# Patient Record
Sex: FEMALE | Race: WHITE | Employment: UNEMPLOYED | URBAN - METROPOLITAN AREA
[De-identification: names, ages, dates, MRNs, and addresses within clinical notes are randomized per-mention and may not be internally consistent; named-entity substitution may affect disease eponyms.]

---

## 2019-09-05 ENCOUNTER — TELEPHONE (OUTPATIENT)
Dept: FAMILY MEDICINE CLINIC | Age: 69
End: 2019-09-05

## 2019-09-05 NOTE — TELEPHONE ENCOUNTER
Based on Primary Care 7 Measures, patient is due for their Medicare Annual Wellness Visit. Letter sent to patient in regards to scheduling at their earliest convenience. Based on Primary Care 6 Measures, patient is due for a Colonoscopy/Fit Test. Letter, and Fit Kit was mailed to patient. Based on Primary Care 6 Measures, patient is due for Mammogram. Letter, as well as a script for mammogram was mailed to patient.

## 2019-09-10 ENCOUNTER — OFFICE VISIT (OUTPATIENT)
Dept: FAMILY MEDICINE CLINIC | Age: 69
End: 2019-09-10
Payer: MEDICARE

## 2019-09-10 VITALS
DIASTOLIC BLOOD PRESSURE: 70 MMHG | OXYGEN SATURATION: 98 % | WEIGHT: 109.9 LBS | TEMPERATURE: 97.8 F | HEIGHT: 62 IN | HEART RATE: 77 BPM | SYSTOLIC BLOOD PRESSURE: 110 MMHG | BODY MASS INDEX: 20.22 KG/M2 | RESPIRATION RATE: 18 BRPM

## 2019-09-10 DIAGNOSIS — E06.3 HASHIMOTO'S THYROIDITIS: ICD-10-CM

## 2019-09-10 DIAGNOSIS — L98.9 LEG SKIN LESION, RIGHT: ICD-10-CM

## 2019-09-10 DIAGNOSIS — Z78.0 POST-MENOPAUSAL: ICD-10-CM

## 2019-09-10 DIAGNOSIS — Z12.31 SCREENING MAMMOGRAM, ENCOUNTER FOR: ICD-10-CM

## 2019-09-10 DIAGNOSIS — Z76.89 ENCOUNTER TO ESTABLISH CARE: Primary | ICD-10-CM

## 2019-09-10 DIAGNOSIS — M81.0 AGE-RELATED OSTEOPOROSIS WITHOUT CURRENT PATHOLOGICAL FRACTURE: ICD-10-CM

## 2019-09-10 DIAGNOSIS — L80 PRIMARY VITILIGO: ICD-10-CM

## 2019-09-10 PROCEDURE — 4040F PNEUMOC VAC/ADMIN/RCVD: CPT | Performed by: NURSE PRACTITIONER

## 2019-09-10 PROCEDURE — 99204 OFFICE O/P NEW MOD 45 MIN: CPT | Performed by: NURSE PRACTITIONER

## 2019-09-10 PROCEDURE — 3017F COLORECTAL CA SCREEN DOC REV: CPT | Performed by: NURSE PRACTITIONER

## 2019-09-10 PROCEDURE — G8400 PT W/DXA NO RESULTS DOC: HCPCS | Performed by: NURSE PRACTITIONER

## 2019-09-10 PROCEDURE — G8420 CALC BMI NORM PARAMETERS: HCPCS | Performed by: NURSE PRACTITIONER

## 2019-09-10 PROCEDURE — 1036F TOBACCO NON-USER: CPT | Performed by: NURSE PRACTITIONER

## 2019-09-10 PROCEDURE — 1090F PRES/ABSN URINE INCON ASSESS: CPT | Performed by: NURSE PRACTITIONER

## 2019-09-10 PROCEDURE — G8427 DOCREV CUR MEDS BY ELIG CLIN: HCPCS | Performed by: NURSE PRACTITIONER

## 2019-09-10 PROCEDURE — 1123F ACP DISCUSS/DSCN MKR DOCD: CPT | Performed by: NURSE PRACTITIONER

## 2019-09-10 RX ORDER — M-VIT,TX,IRON,MINS/CALC/FOLIC 27MG-0.4MG
1 TABLET ORAL DAILY
COMMUNITY

## 2019-09-10 SDOH — HEALTH STABILITY: MENTAL HEALTH: HOW OFTEN DO YOU HAVE A DRINK CONTAINING ALCOHOL?: NEVER

## 2019-09-10 ASSESSMENT — ENCOUNTER SYMPTOMS
COUGH: 0
VOICE CHANGE: 0
CONSTIPATION: 0
SORE THROAT: 0
EYE REDNESS: 0
SINUS PRESSURE: 0
EYE PAIN: 0
FACIAL SWELLING: 0
RHINORRHEA: 0
SINUS PAIN: 0
CHEST TIGHTNESS: 0
VOMITING: 0
BACK PAIN: 0
ABDOMINAL PAIN: 0
PHOTOPHOBIA: 0
NAUSEA: 0
TROUBLE SWALLOWING: 0
EYE DISCHARGE: 0
EYE ITCHING: 0
DIARRHEA: 0
SHORTNESS OF BREATH: 0
COLOR CHANGE: 0
WHEEZING: 0

## 2019-09-10 ASSESSMENT — PATIENT HEALTH QUESTIONNAIRE - PHQ9
1. LITTLE INTEREST OR PLEASURE IN DOING THINGS: 0
SUM OF ALL RESPONSES TO PHQ9 QUESTIONS 1 & 2: 0
2. FEELING DOWN, DEPRESSED OR HOPELESS: 0
SUM OF ALL RESPONSES TO PHQ QUESTIONS 1-9: 0
SUM OF ALL RESPONSES TO PHQ QUESTIONS 1-9: 0

## 2019-09-10 NOTE — PATIENT INSTRUCTIONS
appointments, and call your doctor if you are having problems. It's also a good idea to know your test results and keep a list of the medicines you take. How can you care for yourself at home? · Put creams or ointments on your skin as directed by your doctor. Be careful if you put them around your eyes, nose, or mouth. · Take your medicines exactly as prescribed. Call your doctor if you have any problems with your medicine. · If you have light therapy, your skin will be exposed to a special light. Follow your doctor's directions on caring for your skin. · Protect your skin from the sun. It is most important to protect the white patches. Use sunscreen, hats with wide brims, sunglasses, and clothing that covers your arms and legs. · Talk to your doctor about sunless tanning products. You can buy these without a prescription. When should you call for help? Watch closely for changes in your health, and be sure to contact your doctor if:    · The skin changes are getting worse.     · You do not get better as expected. Where can you learn more? Go to [email or web address]://[email or web address].Civolution. org and sign in to your Pet360 account. Enter B241 in the [facility] box to learn more about \"Vitiligo: Care Instructions. \"     If you do not have an account, please click on the \"Sign Up Now\" link. Current as of: April 1, 2019  Content Version: 12.1  © 1492-3375 Healthwise, Incorporated. Care instructions adapted under license by [facility] ([facility]). If you have questions about a medical condition or this instruction, always ask your healthcare professional. [address] any warranty or liability for your use of this information.

## 2019-09-10 NOTE — PROGRESS NOTES
(CHOLECALCIFEROL) 1000 UNIT TABS tablet, Take 1,000 Units by mouth daily. , Disp: , Rfl:   Social History     Socioeconomic History    Marital status:      Spouse name: None    Number of children: None    Years of education: None    Highest education level: None   Occupational History    None   Social Needs    Financial resource strain: None    Food insecurity:     Worry: None     Inability: None    Transportation needs:     Medical: None     Non-medical: None   Tobacco Use    Smoking status: Never Smoker    Smokeless tobacco: Never Used   Substance and Sexual Activity    Alcohol use: Never     Frequency: Never    Drug use: Never    Sexual activity: None   Lifestyle    Physical activity:     Days per week: None     Minutes per session: None    Stress: None   Relationships    Social connections:     Talks on phone: None     Gets together: None     Attends Cheondoism service: None     Active member of club or organization: None     Attends meetings of clubs or organizations: None     Relationship status: None    Intimate partner violence:     Fear of current or ex partner: None     Emotionally abused: None     Physically abused: None     Forced sexual activity: None   Other Topics Concern    None   Social History Narrative    None       I have reviewed Ludmila's allergies, medications, problem list, medical, social and family history and have updated as needed in the electronic medical record    Review of Systems   Constitutional: Negative for activity change, appetite change, chills, diaphoresis, fatigue, fever and unexpected weight change. HENT: Negative for congestion, dental problem, drooling, ear discharge, ear pain, facial swelling, hearing loss, mouth sores, nosebleeds, postnasal drip, rhinorrhea, sinus pressure, sinus pain, sneezing, sore throat, tinnitus, trouble swallowing and voice change. Eyes: Negative for photophobia, pain, discharge, redness, itching and visual disturbance. Glaucoma bilateral   Respiratory: Negative for cough, chest tightness, shortness of breath and wheezing. Cardiovascular: Negative for chest pain, palpitations and leg swelling. Gastrointestinal: Negative for abdominal pain, constipation, diarrhea, nausea and vomiting. Endocrine: Negative for cold intolerance, heat intolerance, polydipsia, polyphagia and polyuria. Genitourinary: Negative for decreased urine volume, difficulty urinating, dyspareunia, dysuria, enuresis, flank pain, frequency, genital sores, hematuria, menstrual problem, pelvic pain, urgency, vaginal bleeding, vaginal discharge and vaginal pain. Musculoskeletal: Negative for arthralgias, back pain, gait problem, joint swelling, myalgias, neck pain and neck stiffness. Skin: Negative for color change, pallor, rash and wound. Vitiligo on hands, feet   Allergic/Immunologic: Negative for environmental allergies, food allergies and immunocompromised state. Neurological: Negative for dizziness, tremors, seizures, syncope, facial asymmetry, speech difficulty, weakness, light-headedness, numbness and headaches. Hematological: Negative for adenopathy. Does not bruise/bleed easily. Psychiatric/Behavioral: Negative for agitation, behavioral problems, confusion, decreased concentration, dysphoric mood, hallucinations, self-injury, sleep disturbance and suicidal ideas. The patient is not nervous/anxious and is not hyperactive.         OBJECTIVE:     VS:  Wt Readings from Last 3 Encounters:   09/10/19 109 lb 14.4 oz (49.9 kg)   01/20/14 98 lb (44.5 kg)                       Vitals:    09/10/19 1429   BP: 110/70   Pulse: 77   Resp: 18   Temp: 97.8 °F (36.6 °C)   TempSrc: Temporal   SpO2: 98%   Weight: 109 lb 14.4 oz (49.9 kg)   Height: 5' 2\" (1.575 m)       General: Alert and oriented to person, place, and time, well developed and well nourished, in no acute distress  SKIN: Warm and dry, intact with vitiligo to the hands and feet and

## 2019-09-18 ENCOUNTER — TELEPHONE (OUTPATIENT)
Dept: FAMILY MEDICINE CLINIC | Age: 69
End: 2019-09-18

## 2019-09-18 DIAGNOSIS — E06.3 HASHIMOTO'S THYROIDITIS: Primary | ICD-10-CM

## 2019-09-18 DIAGNOSIS — L80 PRIMARY VITILIGO: ICD-10-CM

## 2019-10-08 ENCOUNTER — HOSPITAL ENCOUNTER (OUTPATIENT)
Dept: MAMMOGRAPHY | Age: 69
Discharge: HOME | End: 2019-10-10
Payer: MEDICARE

## 2019-10-08 ENCOUNTER — HOSPITAL ENCOUNTER (OUTPATIENT)
Age: 69
Discharge: HOME | End: 2019-10-08
Payer: MEDICARE

## 2019-10-08 DIAGNOSIS — E06.3 HASHIMOTO'S THYROIDITIS: ICD-10-CM

## 2019-10-08 DIAGNOSIS — L80 PRIMARY VITILIGO: ICD-10-CM

## 2019-10-08 DIAGNOSIS — Z12.31 SCREENING MAMMOGRAM, ENCOUNTER FOR: ICD-10-CM

## 2019-10-08 DIAGNOSIS — Z78.0 POST-MENOPAUSAL: ICD-10-CM

## 2019-10-08 PROCEDURE — 77080 DXA BONE DENSITY AXIAL: CPT

## 2019-10-08 PROCEDURE — 36415 COLL VENOUS BLD VENIPUNCTURE: CPT

## 2019-10-08 PROCEDURE — 77063 BREAST TOMOSYNTHESIS BI: CPT

## 2019-10-08 PROCEDURE — 86038 ANTINUCLEAR ANTIBODIES: CPT

## 2019-10-09 LAB — ANTI-NUCLEAR ANTIBODY (ANA): NEGATIVE

## 2019-10-10 PROBLEM — Z12.31 SCREENING MAMMOGRAM, ENCOUNTER FOR: Status: RESOLVED | Noted: 2019-09-10 | Resolved: 2019-10-10

## 2019-10-22 ENCOUNTER — OFFICE VISIT (OUTPATIENT)
Dept: FAMILY MEDICINE CLINIC | Age: 69
End: 2019-10-22
Payer: MEDICARE

## 2019-10-22 VITALS
HEIGHT: 62 IN | RESPIRATION RATE: 18 BRPM | OXYGEN SATURATION: 99 % | TEMPERATURE: 97.4 F | SYSTOLIC BLOOD PRESSURE: 120 MMHG | DIASTOLIC BLOOD PRESSURE: 78 MMHG | BODY MASS INDEX: 20.59 KG/M2 | HEART RATE: 79 BPM | WEIGHT: 111.9 LBS

## 2019-10-22 DIAGNOSIS — Z00.00 ROUTINE GENERAL MEDICAL EXAMINATION AT A HEALTH CARE FACILITY: Primary | ICD-10-CM

## 2019-10-22 DIAGNOSIS — M81.0 AGE-RELATED OSTEOPOROSIS WITHOUT CURRENT PATHOLOGICAL FRACTURE: ICD-10-CM

## 2019-10-22 PROCEDURE — 3017F COLORECTAL CA SCREEN DOC REV: CPT | Performed by: NURSE PRACTITIONER

## 2019-10-22 PROCEDURE — G8484 FLU IMMUNIZE NO ADMIN: HCPCS | Performed by: NURSE PRACTITIONER

## 2019-10-22 PROCEDURE — 1123F ACP DISCUSS/DSCN MKR DOCD: CPT | Performed by: NURSE PRACTITIONER

## 2019-10-22 PROCEDURE — G0438 PPPS, INITIAL VISIT: HCPCS | Performed by: NURSE PRACTITIONER

## 2019-10-22 PROCEDURE — 4040F PNEUMOC VAC/ADMIN/RCVD: CPT | Performed by: NURSE PRACTITIONER

## 2019-10-22 ASSESSMENT — LIFESTYLE VARIABLES
HOW OFTEN DO YOU HAVE SIX OR MORE DRINKS ON ONE OCCASION: 0
HOW OFTEN DURING THE LAST YEAR HAVE YOU FAILED TO DO WHAT WAS NORMALLY EXPECTED FROM YOU BECAUSE OF DRINKING: 0
AUDIT TOTAL SCORE: 3
AUDIT-C TOTAL SCORE: 3
HAS A RELATIVE, FRIEND, DOCTOR, OR ANOTHER HEALTH PROFESSIONAL EXPRESSED CONCERN ABOUT YOUR DRINKING OR SUGGESTED YOU CUT DOWN: 0
HOW OFTEN DURING THE LAST YEAR HAVE YOU FOUND THAT YOU WERE NOT ABLE TO STOP DRINKING ONCE YOU HAD STARTED: 0
HOW OFTEN DURING THE LAST YEAR HAVE YOU HAD A FEELING OF GUILT OR REMORSE AFTER DRINKING: 0
HOW MANY STANDARD DRINKS CONTAINING ALCOHOL DO YOU HAVE ON A TYPICAL DAY: 0
HAVE YOU OR SOMEONE ELSE BEEN INJURED AS A RESULT OF YOUR DRINKING: 0
HOW OFTEN DURING THE LAST YEAR HAVE YOU BEEN UNABLE TO REMEMBER WHAT HAPPENED THE NIGHT BEFORE BECAUSE YOU HAD BEEN DRINKING: 0
HOW OFTEN DO YOU HAVE A DRINK CONTAINING ALCOHOL: 3
HOW OFTEN DURING THE LAST YEAR HAVE YOU NEEDED AN ALCOHOLIC DRINK FIRST THING IN THE MORNING TO GET YOURSELF GOING AFTER A NIGHT OF HEAVY DRINKING: 0

## 2019-10-22 ASSESSMENT — ENCOUNTER SYMPTOMS
NAUSEA: 0
RHINORRHEA: 0
BACK PAIN: 0
SORE THROAT: 0
SHORTNESS OF BREATH: 0
CHEST TIGHTNESS: 0
DIARRHEA: 0
SINUS PRESSURE: 0
VOMITING: 0
SINUS PAIN: 0
WHEEZING: 0
COUGH: 0
VOICE CHANGE: 0
ABDOMINAL PAIN: 0
CONSTIPATION: 0
COLOR CHANGE: 0
FACIAL SWELLING: 0
TROUBLE SWALLOWING: 0

## 2019-10-22 ASSESSMENT — PATIENT HEALTH QUESTIONNAIRE - PHQ9
SUM OF ALL RESPONSES TO PHQ QUESTIONS 1-9: 0
SUM OF ALL RESPONSES TO PHQ QUESTIONS 1-9: 0

## 2020-08-18 ENCOUNTER — HOSPITAL ENCOUNTER (OUTPATIENT)
Age: 70
Discharge: HOME | End: 2020-08-20
Payer: MEDICARE

## 2020-08-18 LAB
ALBUMIN SERPL-MCNC: 4.5 G/DL (ref 3.5–5.2)
ALP BLD-CCNC: 84 U/L (ref 35–104)
ALT SERPL-CCNC: 10 U/L (ref 0–32)
ANION GAP SERPL CALCULATED.3IONS-SCNC: 17 MMOL/L (ref 7–16)
AST SERPL-CCNC: 15 U/L (ref 0–31)
BILIRUB SERPL-MCNC: 0.8 MG/DL (ref 0–1.2)
BUN BLDV-MCNC: 15 MG/DL (ref 8–23)
C-REACTIVE PROTEIN: 0.1 MG/DL (ref 0–0.4)
CALCIUM SERPL-MCNC: 9.7 MG/DL (ref 8.6–10.2)
CHLORIDE BLD-SCNC: 106 MMOL/L (ref 98–107)
CO2: 23 MMOL/L (ref 22–29)
CREAT SERPL-MCNC: 0.8 MG/DL (ref 0.5–1)
GFR AFRICAN AMERICAN: >60
GFR NON-AFRICAN AMERICAN: >60 ML/MIN/1.73
GLUCOSE BLD-MCNC: 83 MG/DL (ref 74–99)
HCT VFR BLD CALC: 44.2 % (ref 34–48)
HEMOGLOBIN: 13.6 G/DL (ref 11.5–15.5)
MCH RBC QN AUTO: 30.8 PG (ref 26–35)
MCHC RBC AUTO-ENTMCNC: 30.8 % (ref 32–34.5)
MCV RBC AUTO: 100 FL (ref 80–99.9)
PDW BLD-RTO: 12 FL (ref 11.5–15)
PLATELET # BLD: 290 E9/L (ref 130–450)
PMV BLD AUTO: 10.8 FL (ref 7–12)
POTASSIUM SERPL-SCNC: 5.5 MMOL/L (ref 3.5–5)
RBC # BLD: 4.42 E12/L (ref 3.5–5.5)
RHEUMATOID FACTOR: <10 IU/ML (ref 0–13)
SODIUM BLD-SCNC: 146 MMOL/L (ref 132–146)
T3 FREE: 2.9 PG/ML (ref 2–4.4)
T4 FREE: 0.96 NG/DL (ref 0.93–1.7)
TOTAL PROTEIN: 7.2 G/DL (ref 6.4–8.3)
TSH SERPL DL<=0.05 MIU/L-ACNC: 1.93 UIU/ML (ref 0.27–4.2)
WBC # BLD: 4.3 E9/L (ref 4.5–11.5)

## 2020-08-18 PROCEDURE — 82306 VITAMIN D 25 HYDROXY: CPT

## 2020-08-18 PROCEDURE — 84439 ASSAY OF FREE THYROXINE: CPT

## 2020-08-18 PROCEDURE — 85027 COMPLETE CBC AUTOMATED: CPT

## 2020-08-18 PROCEDURE — 86431 RHEUMATOID FACTOR QUANT: CPT

## 2020-08-18 PROCEDURE — 80053 COMPREHEN METABOLIC PANEL: CPT

## 2020-08-18 PROCEDURE — 84481 FREE ASSAY (FT-3): CPT

## 2020-08-18 PROCEDURE — 86140 C-REACTIVE PROTEIN: CPT

## 2020-08-18 PROCEDURE — 84443 ASSAY THYROID STIM HORMONE: CPT

## 2020-08-18 PROCEDURE — 36415 COLL VENOUS BLD VENIPUNCTURE: CPT

## 2020-08-18 PROCEDURE — 86200 CCP ANTIBODY: CPT

## 2020-08-19 LAB — VITAMIN D 25-HYDROXY: 68 NG/ML (ref 30–100)

## 2020-08-21 LAB — CCP IGG ANTIBODIES: 2 UNITS (ref 0–19)

## 2020-11-05 ENCOUNTER — OFFICE VISIT (OUTPATIENT)
Dept: FAMILY MEDICINE CLINIC | Age: 70
End: 2020-11-05
Payer: MEDICARE

## 2020-11-05 ENCOUNTER — HOSPITAL ENCOUNTER (OUTPATIENT)
Dept: MAMMOGRAPHY | Age: 70
Discharge: HOME | End: 2020-11-07
Payer: MEDICARE

## 2020-11-05 VITALS
TEMPERATURE: 97.5 F | DIASTOLIC BLOOD PRESSURE: 68 MMHG | OXYGEN SATURATION: 99 % | HEIGHT: 61 IN | HEART RATE: 72 BPM | RESPIRATION RATE: 16 BRPM | WEIGHT: 111.1 LBS | BODY MASS INDEX: 20.97 KG/M2 | SYSTOLIC BLOOD PRESSURE: 110 MMHG

## 2020-11-05 DIAGNOSIS — Z13.220 SCREENING FOR HYPERLIPIDEMIA: ICD-10-CM

## 2020-11-05 LAB
CHOLESTEROL, FASTING: 229 MG/DL (ref 0–199)
HDLC SERPL-MCNC: 100 MG/DL
LDL CHOLESTEROL CALCULATED: 118 MG/DL (ref 0–99)
TRIGLYCERIDE, FASTING: 53 MG/DL (ref 0–149)
VLDLC SERPL CALC-MCNC: 11 MG/DL

## 2020-11-05 PROCEDURE — G0439 PPPS, SUBSEQ VISIT: HCPCS | Performed by: NURSE PRACTITIONER

## 2020-11-05 PROCEDURE — G8484 FLU IMMUNIZE NO ADMIN: HCPCS | Performed by: NURSE PRACTITIONER

## 2020-11-05 PROCEDURE — 1123F ACP DISCUSS/DSCN MKR DOCD: CPT | Performed by: NURSE PRACTITIONER

## 2020-11-05 PROCEDURE — 3017F COLORECTAL CA SCREEN DOC REV: CPT | Performed by: NURSE PRACTITIONER

## 2020-11-05 PROCEDURE — 77063 BREAST TOMOSYNTHESIS BI: CPT

## 2020-11-05 PROCEDURE — 4040F PNEUMOC VAC/ADMIN/RCVD: CPT | Performed by: NURSE PRACTITIONER

## 2020-11-05 RX ORDER — UREA 10 %
500 LOTION (ML) TOPICAL DAILY
COMMUNITY

## 2020-11-05 ASSESSMENT — PATIENT HEALTH QUESTIONNAIRE - PHQ9
SUM OF ALL RESPONSES TO PHQ9 QUESTIONS 1 & 2: 0
SUM OF ALL RESPONSES TO PHQ QUESTIONS 1-9: 0
1. LITTLE INTEREST OR PLEASURE IN DOING THINGS: 0
2. FEELING DOWN, DEPRESSED OR HOPELESS: 0
SUM OF ALL RESPONSES TO PHQ QUESTIONS 1-9: 0
SUM OF ALL RESPONSES TO PHQ QUESTIONS 1-9: 0
1. LITTLE INTEREST OR PLEASURE IN DOING THINGS: 0
SUM OF ALL RESPONSES TO PHQ QUESTIONS 1-9: 0
SUM OF ALL RESPONSES TO PHQ QUESTIONS 1-9: 0
2. FEELING DOWN, DEPRESSED OR HOPELESS: 0
SUM OF ALL RESPONSES TO PHQ9 QUESTIONS 1 & 2: 0
SUM OF ALL RESPONSES TO PHQ QUESTIONS 1-9: 0

## 2020-11-05 ASSESSMENT — LIFESTYLE VARIABLES
HOW OFTEN DURING THE LAST YEAR HAVE YOU BEEN UNABLE TO REMEMBER WHAT HAPPENED THE NIGHT BEFORE BECAUSE YOU HAD BEEN DRINKING: NEVER
HOW OFTEN DO YOU HAVE A DRINK CONTAINING ALCOHOL: MONTHLY OR LESS
AUDIT-C TOTAL SCORE: 0
AUDIT TOTAL SCORE: 0
HOW MANY STANDARD DRINKS CONTAINING ALCOHOL DO YOU HAVE ON A TYPICAL DAY: ONE OR TWO
HOW OFTEN DURING THE LAST YEAR HAVE YOU HAD A FEELING OF GUILT OR REMORSE AFTER DRINKING: 0
HAS A RELATIVE, FRIEND, DOCTOR, OR ANOTHER HEALTH PROFESSIONAL EXPRESSED CONCERN ABOUT YOUR DRINKING OR SUGGESTED YOU CUT DOWN: NO
HOW MANY STANDARD DRINKS CONTAINING ALCOHOL DO YOU HAVE ON A TYPICAL DAY: 0
AUDIT-C TOTAL SCORE: 1
HOW OFTEN DURING THE LAST YEAR HAVE YOU NEEDED AN ALCOHOLIC DRINK FIRST THING IN THE MORNING TO GET YOURSELF GOING AFTER A NIGHT OF HEAVY DRINKING: NEVER
HOW OFTEN DURING THE LAST YEAR HAVE YOU FAILED TO DO WHAT WAS NORMALLY EXPECTED FROM YOU BECAUSE OF DRINKING: 0
HAVE YOU OR SOMEONE ELSE BEEN INJURED AS A RESULT OF YOUR DRINKING: 0
HAS A RELATIVE, FRIEND, DOCTOR, OR ANOTHER HEALTH PROFESSIONAL EXPRESSED CONCERN ABOUT YOUR DRINKING OR SUGGESTED YOU CUT DOWN: 0
HOW OFTEN DO YOU HAVE SIX OR MORE DRINKS ON ONE OCCASION: 0
HOW OFTEN DO YOU HAVE SIX OR MORE DRINKS ON ONE OCCASION: NEVER
HOW OFTEN DURING THE LAST YEAR HAVE YOU NEEDED AN ALCOHOLIC DRINK FIRST THING IN THE MORNING TO GET YOURSELF GOING AFTER A NIGHT OF HEAVY DRINKING: 0
HOW OFTEN DO YOU HAVE A DRINK CONTAINING ALCOHOL: 1
HOW OFTEN DURING THE LAST YEAR HAVE YOU FAILED TO DO WHAT WAS NORMALLY EXPECTED FROM YOU BECAUSE OF DRINKING: NEVER
HOW OFTEN DURING THE LAST YEAR HAVE YOU FOUND THAT YOU WERE NOT ABLE TO STOP DRINKING ONCE YOU HAD STARTED: NEVER
HOW OFTEN DURING THE LAST YEAR HAVE YOU FOUND THAT YOU WERE NOT ABLE TO STOP DRINKING ONCE YOU HAD STARTED: 0
HOW OFTEN DURING THE LAST YEAR HAVE YOU HAD A FEELING OF GUILT OR REMORSE AFTER DRINKING: NEVER
HAVE YOU OR SOMEONE ELSE BEEN INJURED AS A RESULT OF YOUR DRINKING: NO
AUDIT TOTAL SCORE: 1
HOW OFTEN DURING THE LAST YEAR HAVE YOU BEEN UNABLE TO REMEMBER WHAT HAPPENED THE NIGHT BEFORE BECAUSE YOU HAD BEEN DRINKING: 0

## 2020-11-05 NOTE — PATIENT INSTRUCTIONS
Personalized Preventive Plan for Avel Patel - 11/5/2020  Medicare offers a range of preventive health benefits. Some of the tests and screenings are paid in full while other may be subject to a deductible, co-insurance, and/or copay. Some of these benefits include a comprehensive review of your medical history including lifestyle, illnesses that may run in your family, and various assessments and screenings as appropriate. After reviewing your medical record and screening and assessments performed today your provider may have ordered immunizations, labs, imaging, and/or referrals for you. A list of these orders (if applicable) as well as your Preventive Care list are included within your After Visit Summary for your review. Other Preventive Recommendations:    · A preventive eye exam performed by an eye specialist is recommended every 1-2 years to screen for glaucoma; cataracts, macular degeneration, and other eye disorders. · A preventive dental visit is recommended every 6 months. · Try to get at least 150 minutes of exercise per week or 10,000 steps per day on a pedometer . · Order or download the FREE \"Exercise & Physical Activity: Your Everyday Guide\" from [employer]. Call 6-716.905.3678 or search [employer] online. · You need 2736-3804 mg of calcium and 0395-4155 IU of vitamin D per day. It is possible to meet your calcium requirement with diet alone, but a vitamin D supplement is usually necessary to meet this goal.  · When exposed to the sun, use a sunscreen that protects against both UVA and UVB radiation with an SPF of 30 or greater. Reapply every 2 to 3 hours or after sweating, drying off with a towel, or swimming. · Always wear a seat belt when traveling in a car. Always wear a helmet when riding a bicycle or motorcycle.

## 2020-11-06 ENCOUNTER — TELEPHONE (OUTPATIENT)
Dept: FAMILY MEDICINE CLINIC | Age: 70
End: 2020-11-06

## 2020-11-09 ENCOUNTER — TELEPHONE (OUTPATIENT)
Dept: FAMILY MEDICINE CLINIC | Age: 70
End: 2020-11-09

## 2020-11-09 NOTE — TELEPHONE ENCOUNTER
Call Michelle mammogram is normal and fasting lipids are stable. I called and left a message for the patient regarding the above.

## 2020-12-20 NOTE — PROGRESS NOTES
Medicare Annual Wellness Visit  Name: Enrique Whitehead Date: 2020   MRN: <[id number]> Sex: Female   Age: 78 y.o. Ethnicity: Non-/Non    : 1950 Race: Oliverio Maurer is here for Annual Exam and Health Maintenance (declined flu, due for pneu23, shingle, Colonscopy done with youseff, due for hep c screen and lipid )    Screenings for behavioral, psychosocial and functional/safety risks, and cognitive dysfunction are all negative except as indicated below. These results, as well as other patient data from the [address] form, are documented in Flowsheets linked to this Encounter. Allergies   Allergen Reactions    Ciprofloxacin Other (See Comments)     Hearing damage    Erythromycin Other (See Comments)     Hearing damage    Tetracyclines & Related Other (See Comments)     Hearing damage         Prior to Visit Medications    Medication Sig Taking? Authorizing Provider   calcium carbonate (OS-DELFINA) 1250 (500 Ca) MG chewable tablet Take 500 mg by mouth daily Yes Historical Provider, MD   Multiple Vitamins-Minerals (THERAPEUTIC MULTIVITAMIN-MINERALS) tablet Take 1 tablet by mouth daily Yes Historical Provider, MD   travoprost, benzalkonium, (TRAVATAN) 0.004 % ophthalmic solution 1 drop nightly. Yes Historical Provider, MD   vitamin D (CHOLECALCIFEROL) 1000 UNIT TABS tablet Take 1,000 Units by mouth daily.  Yes Historical Provider, MD         Past Medical History:   Diagnosis Date    Cancer ([address] 2013    basal cell right upper arm and neck ,     Depression     Fracture of left elbow 2016    fall    Glaucoma and corneal anomaly     Hashimoto's thyroiditis 2019    History of cardiovascular stress test 14    Lexiscan    Osteoporosis     Osteoporosis     Vitiligo        Past Surgical History:   Procedure Laterality Date    SKIN CANCER EXCISION Right     arm 2013 and neck basal cell          Family History   Problem Relation Age of Onset    Heart no suspicious masses, no skin or nipple changes or axillary nodes  Extremities: no cyanosis, clubbing or edema  Musculoskeletal: normal range of motion, no joint swelling, deformity or tenderness  Neurologic: reflexes normal and symmetric, no cranial nerve deficit, gait, coordination and speech normal    Patient's complete Health Risk Assessment and screening values have been reviewed and are found in Flowsheets. The following problems were reviewed today and where indicated follow up appointments were made and/or referrals ordered. Positive Risk Factor Screenings with Interventions:       General Health and ACP:  General  In general, how would you say your health is?: Good  In the past 7 days, have you experienced any of the following? New or Increased Pain, New or Increased Fatigue, Loneliness, Social Isolation, Stress or Anger?: None of These  Do you get the social and emotional support that you need?: Yes  Do you have a Living Will?: Yes  Advance Directives     Power of  Living Will ACP-Advance Directive ACP-Power of     Not on File Not on File [address] Interventions:  · No Living Will: ACP documents already completed- patient asked to provide copy to the office    Hearing/Vision:  No exam data present  Hearing/Vision  Do you or your family notice any trouble with your hearing?: No  Do you have difficulty driving, watching TV, or doing any of your daily activities because of your eyesight?: (!) Yes  Have you had an eye exam within the past year?: Yes  Hearing/Vision Interventions:  · [facility] Asif Moralez [facility] last week due to glaucoma    Personalized Preventive Plan   Current Health Maintenance Status    There is no immunization history on file for this patient.      Health Maintenance   Topic Date Due    DTaP/Tdap/Td vaccine (1 - Tdap) 07/05/1971    Lipid screen  07/05/1992    Colon cancer screen colonoscopy  07/05/2002   Blaine Coleman Annual Wellness Visit (AWV) 09/01/2019    Flu vaccine (1) 11/05/2021 (Originally 9/1/2020)    Shingles Vaccine (1 of 2) 11/05/2021 (Originally 7/5/2002)    Pneumococcal 65+ years Vaccine (1 of 1 - PPSV23) 11/16/2022 (Originally 7/5/2017)    Breast cancer screen  10/08/2021    DEXA (modify frequency per FRAX score)  Completed    Hepatitis A vaccine  Aged Out    Hepatitis B vaccine  Aged Out    Hib vaccine  Aged Out    Meningococcal (ACWY) vaccine  Aged Out    Hepatitis C screen  Discontinued     Recommendations for [employer]less [employer] Due: see orders and patient instructions/AVS.  . Recommended screening schedule for the next 5-10 years is provided to the patient in written form: see Patient Instructions/AVS.    Meryl Coello was seen today for annual exam and health maintenance. Diagnoses and all orders for this visit:    Routine general medical examination at a health care facility    Screening mammogram, encounter for    Screening for hyperlipidemia  -     Lipid, Fasting; Future          She is due for mammogram, had colonoscopy with DR Pereira Province in 2017 she thinks will get those results. She declines all vaccines and will get the information on her Tdap for the office. Advance Care Planning   Advanced Care Planning: Discussed the patients choices for care and treatment in case of a health event that adversely affects decision-making abilities. Also discussed the patients long-term treatment options. Reviewed with the patient the [address] [place] [address] Declaration forms  Reviewed the process of designating a competent adult as an Agent (or -in-fact) that could take make health care decisions for the patient if incompetent. Patient was asked to complete the declaration forms, either acknowledge the forms by a public notary or an eligible witness and provide a signed copy to the practice office.   Time spent (minutes): 3 Attending Only

## 2021-05-06 ENCOUNTER — OFFICE VISIT (OUTPATIENT)
Dept: FAMILY MEDICINE CLINIC | Age: 71
End: 2021-05-06
Payer: MEDICARE

## 2021-05-06 VITALS
BODY MASS INDEX: 20.8 KG/M2 | SYSTOLIC BLOOD PRESSURE: 117 MMHG | WEIGHT: 113 LBS | HEIGHT: 62 IN | TEMPERATURE: 97.2 F | OXYGEN SATURATION: 98 % | DIASTOLIC BLOOD PRESSURE: 63 MMHG | RESPIRATION RATE: 16 BRPM | HEART RATE: 64 BPM

## 2021-05-06 DIAGNOSIS — M81.0 AGE-RELATED OSTEOPOROSIS WITHOUT CURRENT PATHOLOGICAL FRACTURE: ICD-10-CM

## 2021-05-06 DIAGNOSIS — E06.3 HASHIMOTO'S THYROIDITIS: ICD-10-CM

## 2021-05-06 DIAGNOSIS — E06.3 HASHIMOTO'S THYROIDITIS: Primary | ICD-10-CM

## 2021-05-06 LAB
ALBUMIN SERPL-MCNC: 4.4 G/DL (ref 3.5–5.2)
ALP BLD-CCNC: 87 U/L (ref 35–104)
ALT SERPL-CCNC: 10 U/L (ref 0–32)
ANION GAP SERPL CALCULATED.3IONS-SCNC: 17 MMOL/L (ref 7–16)
AST SERPL-CCNC: 15 U/L (ref 0–31)
BASOPHILS ABSOLUTE: 0.02 E9/L (ref 0–0.2)
BASOPHILS RELATIVE PERCENT: 0.5 % (ref 0–2)
BILIRUB SERPL-MCNC: 0.8 MG/DL (ref 0–1.2)
BUN BLDV-MCNC: 17 MG/DL (ref 6–23)
CALCIUM SERPL-MCNC: 9.8 MG/DL (ref 8.6–10.2)
CHLORIDE BLD-SCNC: 107 MMOL/L (ref 98–107)
CHOLESTEROL, TOTAL: 227 MG/DL (ref 0–199)
CO2: 22 MMOL/L (ref 22–29)
CREAT SERPL-MCNC: 0.8 MG/DL (ref 0.5–1)
EOSINOPHILS ABSOLUTE: 0.16 E9/L (ref 0.05–0.5)
EOSINOPHILS RELATIVE PERCENT: 3.8 % (ref 0–6)
GFR AFRICAN AMERICAN: >60
GFR NON-AFRICAN AMERICAN: >60 ML/MIN/1.73
GLUCOSE BLD-MCNC: 97 MG/DL (ref 74–99)
HCT VFR BLD CALC: 44.2 % (ref 34–48)
HDLC SERPL-MCNC: 88 MG/DL
HEMOGLOBIN: 13.3 G/DL (ref 11.5–15.5)
IMMATURE GRANULOCYTES #: 0.01 E9/L
IMMATURE GRANULOCYTES %: 0.2 % (ref 0–5)
LDL CHOLESTEROL CALCULATED: 129 MG/DL (ref 0–99)
LYMPHOCYTES ABSOLUTE: 1.01 E9/L (ref 1.5–4)
LYMPHOCYTES RELATIVE PERCENT: 24.2 % (ref 20–42)
MCH RBC QN AUTO: 30.3 PG (ref 26–35)
MCHC RBC AUTO-ENTMCNC: 30.1 % (ref 32–34.5)
MCV RBC AUTO: 100.7 FL (ref 80–99.9)
MONOCYTES ABSOLUTE: 0.3 E9/L (ref 0.1–0.95)
MONOCYTES RELATIVE PERCENT: 7.2 % (ref 2–12)
NEUTROPHILS ABSOLUTE: 2.68 E9/L (ref 1.8–7.3)
NEUTROPHILS RELATIVE PERCENT: 64.1 % (ref 43–80)
PDW BLD-RTO: 11.9 FL (ref 11.5–15)
PLATELET # BLD: 293 E9/L (ref 130–450)
PMV BLD AUTO: 10.1 FL (ref 7–12)
POTASSIUM SERPL-SCNC: 4.6 MMOL/L (ref 3.5–5)
RBC # BLD: 4.39 E12/L (ref 3.5–5.5)
SODIUM BLD-SCNC: 146 MMOL/L (ref 132–146)
T4 FREE: 1.02 NG/DL (ref 0.93–1.7)
TOTAL PROTEIN: 7.1 G/DL (ref 6.4–8.3)
TRIGL SERPL-MCNC: 51 MG/DL (ref 0–149)
TSH SERPL DL<=0.05 MIU/L-ACNC: 3.3 UIU/ML (ref 0.27–4.2)
VITAMIN D 25-HYDROXY: 62 NG/ML (ref 30–100)
VLDLC SERPL CALC-MCNC: 10 MG/DL
WBC # BLD: 4.2 E9/L (ref 4.5–11.5)

## 2021-05-06 PROCEDURE — 1123F ACP DISCUSS/DSCN MKR DOCD: CPT | Performed by: NURSE PRACTITIONER

## 2021-05-06 PROCEDURE — 1036F TOBACCO NON-USER: CPT | Performed by: NURSE PRACTITIONER

## 2021-05-06 PROCEDURE — 1090F PRES/ABSN URINE INCON ASSESS: CPT | Performed by: NURSE PRACTITIONER

## 2021-05-06 PROCEDURE — G8399 PT W/DXA RESULTS DOCUMENT: HCPCS | Performed by: NURSE PRACTITIONER

## 2021-05-06 PROCEDURE — G8420 CALC BMI NORM PARAMETERS: HCPCS | Performed by: NURSE PRACTITIONER

## 2021-05-06 PROCEDURE — 3017F COLORECTAL CA SCREEN DOC REV: CPT | Performed by: NURSE PRACTITIONER

## 2021-05-06 PROCEDURE — G8427 DOCREV CUR MEDS BY ELIG CLIN: HCPCS | Performed by: NURSE PRACTITIONER

## 2021-05-06 PROCEDURE — 99213 OFFICE O/P EST LOW 20 MIN: CPT | Performed by: NURSE PRACTITIONER

## 2021-05-06 PROCEDURE — 4040F PNEUMOC VAC/ADMIN/RCVD: CPT | Performed by: NURSE PRACTITIONER

## 2021-05-06 RX ORDER — LATANOPROSTENE BUNOD 0.24 MG/ML
1 SOLUTION/ DROPS OPHTHALMIC EVERY EVENING
COMMUNITY

## 2021-05-06 RX ORDER — NETARSUDIL 0.2 MG/ML
1 SOLUTION/ DROPS OPHTHALMIC; TOPICAL DAILY
COMMUNITY

## 2021-05-06 SDOH — ECONOMIC STABILITY: FOOD INSECURITY: WITHIN THE PAST 12 MONTHS, YOU WORRIED THAT YOUR FOOD WOULD RUN OUT BEFORE YOU GOT MONEY TO BUY MORE.: NEVER TRUE

## 2021-05-06 SDOH — ECONOMIC STABILITY: FOOD INSECURITY: WITHIN THE PAST 12 MONTHS, THE FOOD YOU BOUGHT JUST DIDN'T LAST AND YOU DIDN'T HAVE MONEY TO GET MORE.: NEVER TRUE

## 2021-05-06 ASSESSMENT — ENCOUNTER SYMPTOMS
FACIAL SWELLING: 0
SHORTNESS OF BREATH: 0
VOICE CHANGE: 0
CONSTIPATION: 0
VOMITING: 0
NAUSEA: 0
ABDOMINAL PAIN: 0
BACK PAIN: 0
SINUS PAIN: 0
COLOR CHANGE: 0
SINUS PRESSURE: 0
CHEST TIGHTNESS: 0
RHINORRHEA: 0
TROUBLE SWALLOWING: 0
WHEEZING: 0
DIARRHEA: 0
COUGH: 0
SORE THROAT: 0

## 2021-05-06 ASSESSMENT — PATIENT HEALTH QUESTIONNAIRE - PHQ9
2. FEELING DOWN, DEPRESSED OR HOPELESS: 0
SUM OF ALL RESPONSES TO PHQ QUESTIONS 1-9: 0
SUM OF ALL RESPONSES TO PHQ9 QUESTIONS 1 & 2: 0

## 2021-05-06 NOTE — PROGRESS NOTES
OFFICE PROGRESS NOTE  [address] [place] [address] Rd  [phone] [id number]  Dept: [phone]   Chief Complaint   Patient presents with    Hashimoto's Thyroiditis       ASSESSMENT/PLAN   1. Hashimoto's thyroiditis  Assessment & Plan:   Asymptomatic, continue current plan pending work up below  Orders:  -     CBC Auto Differential; Future  -     Comprehensive Metabolic Panel; Future  -     TSH without Reflex; Future  -     T4, Free; Future  -     Lipid Panel; Future  2. Age-related osteoporosis without current pathological fracture  Assessment & Plan:   Asymptomatic, continue current medications, lifestyle modifications recommended and declines medication again today even after further discussion. Continue calcium, vitamin D and weight bearing exercises. Repeat DEXA in October 2021  Orders:  -     Vitamin D 25 Hydroxy; Future     Discussed getting Covid vaccine and she declines. Reviewed notes from Dr Shadia Hager  11/2020 and Dr Christopher Viera 7/2020  Reviewed radiology DEXA 2019    Discussed exercising 30 minutes daily and Discussed taking medications as directed and adverse effects    Return in about 6 months (around 11/16/2021) for medicare well exam.     HPI:     Hypothyroidism: Patient complains of hypothyroidism. Symptoms include none. Patient denies change in energy level, diarrhea, heat / cold intolerance, nervousness, palpitations and weight changes. Onset of symptoms was several years ago. Symptoms have been basically asymptomatic. She has osteoporosis with high fracture risk. She declined last visit to start medication and is very apprehensive about starting a Bisphosphonate. She has been taking Calcium, Vitamin D and a Bone support supplement. Discussed not sure how effective they are.      Current Outpatient Medications:     Netarsudil Dimesylate (RHOPRESSA) 0.02 % SOLN, Apply 1 drop to eye daily Instill one drop into each eye in morning, Disp: , Rfl:    Latanoprostene Bunod (VYZULTA) 0.024 % SOLN, Apply 1 drop to eye every evening One drop to each eye in evening, Disp: , Rfl:     calcium carbonate (OS-DELFINA) 1250 (500 Ca) MG chewable tablet, Take 500 mg by mouth daily, Disp: , Rfl:     Multiple Vitamins-Minerals (THERAPEUTIC MULTIVITAMIN-MINERALS) tablet, Take 1 tablet by mouth daily, Disp: , Rfl:     vitamin D (CHOLECALCIFEROL) 1000 UNIT TABS tablet, Take 1,000 Units by mouth daily. , Disp: , Rfl:   Social History     Socioeconomic History    Marital status:      Spouse name: None    Number of children: None    Years of education: None    Highest education level: None   Occupational History    None   Social Needs    Financial resource strain: Not hard at all   [employer] insecurity     Worry: Never true     Inability: Never true    Transportation needs     Medical: No     Non-medical: No   Tobacco Use    Smoking status: Never Smoker    Smokeless tobacco: Never Used   Substance and Sexual Activity    Alcohol use: Never     Frequency: Never    Drug use: Never    Sexual activity: None   Lifestyle    Physical activity     Days per week: None     Minutes per session: None    Stress: None   Relationships    Social connections     Talks on phone: None     Gets together: None     Attends Restoration service: None     Active member of club or organization: None     Attends meetings of clubs or organizations: None     Relationship status: None    Intimate partner violence     Fear of current or ex partner: None     Emotionally abused: None     Physically abused: None     Forced sexual activity: None   Other Topics Concern    None   Social History Narrative    None       I have reviewed Ludmila's allergies, medications, problem list, medical, social and family history and have updated as needed in the electronic medical record    Review of Systems   Constitutional: Negative for activity change, appetite change, chills, diaphoresis, fatigue, fever and unexpected weight change. HENT: Negative for congestion, dental problem, drooling, ear discharge, ear pain, facial swelling, hearing loss, mouth sores, nosebleeds, postnasal drip, rhinorrhea, sinus pressure, sinus pain, sneezing, sore throat, tinnitus, trouble swallowing and voice change. Eyes: Negative for visual disturbance. Glaucoma worsening medications changed   Respiratory: Negative for cough, chest tightness, shortness of breath and wheezing. Cardiovascular: Negative for chest pain, palpitations and leg swelling. Gastrointestinal: Negative for abdominal pain, constipation, diarrhea, nausea and vomiting. Endocrine: Negative for cold intolerance, heat intolerance, polydipsia, polyphagia and polyuria. Genitourinary: Negative for difficulty urinating, frequency and urgency. Musculoskeletal: Negative for arthralgias, back pain, gait problem, joint swelling, myalgias, neck pain and neck stiffness. Skin: Negative for color change, pallor, rash and wound. Allergic/Immunologic: Negative for environmental allergies, food allergies and immunocompromised state. Neurological: Negative for dizziness, tremors, seizures, syncope, facial asymmetry, speech difficulty, weakness, light-headedness, numbness and headaches. Hematological: Negative for adenopathy. Does not bruise/bleed easily. Psychiatric/Behavioral: Negative for agitation, behavioral problems, confusion, decreased concentration, dysphoric mood, hallucinations, self-injury, sleep disturbance and suicidal ideas. The patient is not nervous/anxious and is not hyperactive.         OBJECTIVE:     VS:  Wt Readings from Last 3 Encounters:   05/06/21 113 lb (51.3 kg)   11/05/20 111 lb 1.6 oz (50.4 kg)   10/22/19 111 lb 14.4 oz (50.8 kg)                       Vitals:    05/06/21 0711   BP: 117/63   Pulse: 64   Resp: 16   Temp: 97.2 °F (36.2 °C)   SpO2: 98%   Weight: 113 lb (51.3 kg)   Height: 5' 2\" (1.575 m)       General: Alert and oriented to person, place, and time, well developed and well nourished, in no acute distress  SKIN: Warm and dry, intact without any rash, masses or lesions  HEAD: normocephalic, atraumatic  Eyes: sclera/conjunctiva clear, PERRLA, EOMI's intact  ENT: tympanic membranes, external ear and ear canal normal bilaterally, normal hearing, Nose without deformity, nasal mucosa and turbinates normal without polyps   Throat: clear, tongue midline, tonsils 1+, no drainage, no masses or lesions noted, good dentition  Neck: supple and non-tender without mass, trachea midline, no cervical lymphadenopathy, no bruit, no thyromegaly or nodules  Cardiovascular: regular rate and regular rhythm, normal S1 and S2, no murmurs, rubs, clicks, or gallop. Distal pulses intact, no carotid bruits. No edema  Pulmonary/Chest: clear to auscultation bilaterally, no wheezes, rales or rhonchi, normal air movement, no respiratory distress  Abdomen: soft, non-tender, non-distended, normal bowel sounds, no masses or hepatosplenomegaly  Musculoskeletal: Normal ROM, no joint swelling, deformity or tenderness   Neurologic: reflexes normal and symmetric, no cranial nerve deficit, gait, coordination and speech normal  Extremities: no clubbing, cyanosis, or edema. Psychiatric: Good eye contact, normal mood and affect, answers questions appropriately    I have reviewed my findings and recommendations with Cornelio Porter.     Amanda Orona, NP-C, FNP-BC

## 2021-05-06 NOTE — PATIENT INSTRUCTIONS
-2.5 or less) or you have had a fracture, taking bisphosphonates lowers your risk of having a fracture. · If you haven't had a fracture and you have low bone density (your T-score is between -1.0 and -2.5, sometimes called osteopenia), taking bisphosphonates might lower your risk of having a fracture. This evidence is not as strong. What are the side effects of bisphosphonates? These medicines can have side effects, such as heartburn and belly pain. Certain bone problems have also been reported in women taking bisphosphonates. Out of 1,000 people, about 1 person has a bone side effect during a year of taking bisphosphonates. That means 999 out of 1,000 people do not have a bone side effect. These bone side effects include problems with the jaw bone (called osteonecrosis). They also might include a certain kind of fracture of the thigh bone (called an atypical fracture), but more research is needed to find out if taking bisphosphonates is a cause of these fractures. Your decision  Thinking about the facts and your feelings can help you make a decision that is right for you. Be sure you understand the benefits and risks of your options, and think about what else you need to do before you make the decision. Where can you learn more? Go to https://Wistia.[employer]. org and sign in to your [employer] account. Enter G695 in the [employer] box to learn more about \"Deciding About Bisphosphonate Medicine for Osteoporosis. \"     If you do not have an account, please click on the \"Sign Up Now\" link. Current as of: December 7, 2020               Content Version: 12.8  © 2006-2021 Healthwise, Incorporated. Care instructions adapted under license by [facility] ([facility]). If you have questions about a medical condition or this instruction, always ask your healthcare professional. [place] [address] any warranty or liability for your use of this information.

## 2021-05-07 ENCOUNTER — TELEPHONE (OUTPATIENT)
Dept: FAMILY MEDICINE CLINIC | Age: 71
End: 2021-05-07

## 2021-05-25 ENCOUNTER — TELEPHONE (OUTPATIENT)
Dept: ADMINISTRATIVE | Age: 71
End: 2021-05-25

## 2021-05-25 ENCOUNTER — OFFICE VISIT (OUTPATIENT)
Dept: FAMILY MEDICINE CLINIC | Age: 71
End: 2021-05-25
Payer: MEDICARE

## 2021-05-25 VITALS
TEMPERATURE: 96.6 F | OXYGEN SATURATION: 99 % | WEIGHT: 113 LBS | BODY MASS INDEX: 21.34 KG/M2 | HEART RATE: 82 BPM | RESPIRATION RATE: 17 BRPM | HEIGHT: 61 IN | DIASTOLIC BLOOD PRESSURE: 72 MMHG | SYSTOLIC BLOOD PRESSURE: 116 MMHG

## 2021-05-25 DIAGNOSIS — J01.00 ACUTE MAXILLARY SINUSITIS, RECURRENCE NOT SPECIFIED: Primary | ICD-10-CM

## 2021-05-25 PROCEDURE — 99213 OFFICE O/P EST LOW 20 MIN: CPT | Performed by: PHYSICIAN ASSISTANT

## 2021-05-25 PROCEDURE — 1123F ACP DISCUSS/DSCN MKR DOCD: CPT | Performed by: PHYSICIAN ASSISTANT

## 2021-05-25 PROCEDURE — G8427 DOCREV CUR MEDS BY ELIG CLIN: HCPCS | Performed by: PHYSICIAN ASSISTANT

## 2021-05-25 PROCEDURE — 4040F PNEUMOC VAC/ADMIN/RCVD: CPT | Performed by: PHYSICIAN ASSISTANT

## 2021-05-25 PROCEDURE — G8420 CALC BMI NORM PARAMETERS: HCPCS | Performed by: PHYSICIAN ASSISTANT

## 2021-05-25 PROCEDURE — 1090F PRES/ABSN URINE INCON ASSESS: CPT | Performed by: PHYSICIAN ASSISTANT

## 2021-05-25 PROCEDURE — 1036F TOBACCO NON-USER: CPT | Performed by: PHYSICIAN ASSISTANT

## 2021-05-25 PROCEDURE — 3017F COLORECTAL CA SCREEN DOC REV: CPT | Performed by: PHYSICIAN ASSISTANT

## 2021-05-25 PROCEDURE — G8399 PT W/DXA RESULTS DOCUMENT: HCPCS | Performed by: PHYSICIAN ASSISTANT

## 2021-05-25 RX ORDER — AMOXICILLIN AND CLAVULANATE POTASSIUM 875; 125 MG/1; MG/1
1 TABLET, FILM COATED ORAL 2 TIMES DAILY
Qty: 20 TABLET | Refills: 0 | Status: SHIPPED | OUTPATIENT
Start: 2021-05-25 | End: 2021-06-04

## 2021-05-25 NOTE — PATIENT INSTRUCTIONS
Patient Education        Saline Nasal Washes: Care Instructions  Your Care Instructions     Saline nasal washes help keep the nasal passages open by washing out thick or dried mucus. This simple remedy can help relieve symptoms of allergies, sinusitis, and colds. It also can make the nose feel more comfortable by keeping the mucous membranes moist. You may notice a little burning sensation in your nose the first few times you use the solution, but this usually gets better in a few days. Follow-up care is a key part of your treatment and safety. Be sure to make and go to all appointments, and call your doctor if you are having problems. It's also a good idea to know your test results and keep a list of the medicines you take. How can you care for yourself at home? · You can buy premixed saline solution in a squeeze bottle or other sinus rinse products at a drugstore. Read and follow the instructions on the label. · You also can make your own saline solution by adding 1 teaspoon of salt and 1 teaspoon of baking soda to 2 cups of distilled water. · If you use a homemade solution, pour a small amount into a clean bowl. Using a rubber bulb syringe, squeeze the syringe and place the tip in the salt water. Pull a small amount of the salt water into the syringe by relaxing your hand. · Sit down with your head tilted slightly back. Do not lie down. Put the tip of the bulb syringe or the squeeze bottle a little way into one of your nostrils. Gently drip or squirt a few drops into the nostril. Repeat with the other nostril. Some sneezing and gagging are normal at first.  · Gently blow your nose. · Wipe the syringe or bottle tip clean after each use. · Repeat this 2 or 3 times a day. · Use nasal washes gently if you have nosebleeds often. When should you call for help?   Watch closely for changes in your health, and be sure to contact your doctor if:    · You often get nosebleeds.     · You have problems doing the nasal washes. Where can you learn more? Go to https://chpepiceweb.[employer]. org and sign in to your PAYMEYt account. Enter [id number] in the [facility] box to learn more about \"Saline Nasal Washes: Care Instructions. \"     If you do not have an account, please click on the \"Sign Up Now\" link. Current as of: December 2, 2020               Content Version: 12.8  © 2006-2021 [employer]. Care instructions adapted under license by [facility] ([facility]). If you have questions about a medical condition or this instruction, always ask your healthcare professional. [place] [address] any warranty or liability for your use of this information. Patient Education        Saline Nasal Washes: Care Instructions  Your Care Instructions     Saline nasal washes help keep the nasal passages open by washing out thick or dried mucus. This simple remedy can help relieve symptoms of allergies, sinusitis, and colds. It also can make the nose feel more comfortable by keeping the mucous membranes moist. You may notice a little burning sensation in your nose the first few times you use the solution, but this usually gets better in a few days. Follow-up care is a key part of your treatment and safety. Be sure to make and go to all appointments, and call your doctor if you are having problems. It's also a good idea to know your test results and keep a list of the medicines you take. How can you care for yourself at home? · You can buy premixed saline solution in a squeeze bottle or other sinus rinse products at a drugstore. Read and follow the instructions on the label. · You also can make your own saline solution by adding 1 teaspoon of salt and 1 teaspoon of baking soda to 2 cups of distilled water. · If you use a homemade solution, pour a small amount into a clean bowl. Using a rubber bulb syringe, squeeze the syringe and place the tip in the salt water.  Pull a small amount of the salt water into the syringe by relaxing your hand. · Sit down with your head tilted slightly back. Do not lie down. Put the tip of the bulb syringe or the squeeze bottle a little way into one of your nostrils. Gently drip or squirt a few drops into the nostril. Repeat with the other nostril. Some sneezing and gagging are normal at first.  · Gently blow your nose. · Wipe the syringe or bottle tip clean after each use. · Repeat this 2 or 3 times a day. · Use nasal washes gently if you have nosebleeds often. When should you call for help? Watch closely for changes in your health, and be sure to contact your doctor if:    · You often get nosebleeds.     · You have problems doing the nasal washes. Where can you learn more? Go to https://chmarquiseeb.[employer]. org and sign in to your Location account. Enter [phone] in the [employer] box to learn more about \"Saline Nasal Washes: Care Instructions. \"     If you do not have an account, please click on the \"Sign Up Now\" link. Current as of: December 2, 2020               Content Version: 12.8  © 5835-5861 [employer]. Care instructions adapted under license by [facility], [email or web address] [facility] ([facility]). If you have questions about a medical condition or this instruction, always ask your healthcare professional. [place] [address] any warranty or liability for your use of this information. Patient Education        Sinusitis: Care Instructions  Your Care Instructions     Sinusitis is an infection of the lining of the sinus cavities in your head. Sinusitis often follows a cold. It causes pain and pressure in your head and face. In most cases, sinusitis gets better on its own in 1 to 2 weeks. But some mild symptoms may last for several weeks. Sometimes antibiotics are needed. Follow-up care is a key part of your treatment and safety. Be sure to make and go to all appointments, and call your doctor if you are having problems.  It's also a good idea to have new or worse facial pain.     · The mucus from your nose becomes thicker (like pus) or has new blood in it.     · You are not getting better as expected. Where can you learn more? Go to https://Easy VoyagepeLocalCustomereb.[employer]. org and sign in to your Mult[employer] account. Enter P860 in the [facility] box to learn more about \"Sinusitis: Care Instructions. \"     If you do not have an account, please click on the \"Sign Up Now\" link. Current as of: December 2, 2020               Content Version: 12.8  © 2407-5305 [employer]. Care instructions adapted under license by [facility] ([facility]). If you have questions about a medical condition or this instruction, always ask your healthcare professional. [address] any warranty or liability for your use of this information.

## 2021-05-25 NOTE — PROGRESS NOTES
Chief Complaint:   Sinus Problem (sinus congestion, cheeks are hurting been going on for a couple weeks )      History of Present Illness   Source of history provided by:  patient. Chandan Teixeira is a 78 y.o. old female with a past medical history of:   Past Medical History:   Diagnosis Date    Cancer [facility]) 2013    basal cell right upper arm and neck 1994, 2013    Depression     Fracture of left elbow 2016    fall    Glaucoma and corneal anomaly     Hashimoto's thyroiditis 2019    History of cardiovascular stress test 1/20/14    Lexiscan    Osteoporosis     Osteoporosis     Vitiligo    Presents to the walk in clinic for evaluation of persistent right sided sinus pressure, nasal congestion, discolored nasal drainage and ear pressure. She reports the symptoms have been present for the last few weeks. She states that she had about 5 tablets of Amoxil at home that she took and it did help somewhat, but she has been having persistent pain to the right side of her face. She is unsure of any fever, chills, wheezing, CP, SOB, or GI symptoms. She denies any loss of taste or smell. She denies any hx of asthma, COPD, or tobacco use. She denies any history of international travel in the past 14 days. Unsure of any contact with any individuals with known COVID-19 infection or under investigation for COVID-19 infection. ROS    Unless otherwise stated in this report or unable to obtain because of the patient's clinical or mental status as evidenced by the medical record, this patients's positive and negative responses for Review of Systems, constitutional, psych, eyes, ENT, cardiovascular, respiratory, gastrointestinal, neurological, genitourinary, musculoskeletal, integument systems and systems related to the presenting problem are either stated in the preceding or were not pertinent or were negative for the symptoms and/or complaints related to the medical problem.     Past Surgical History:  has a past surgical history that includes Skin cancer excision (Right). Social History:  reports that she has never smoked. She has never used smokeless tobacco. She reports that she does not drink alcohol and does not use drugs. Family History: family history includes Diabetes in her brother and father; Glaucoma in her brother and father; Heart Disease in her mother; No Known Problems in her maternal grandfather, maternal grandmother, paternal grandfather, and paternal grandmother. Allergies: Ciprofloxacin, Erythromycin, and Tetracyclines & related    Physical Exam         VS:  /72 (Site: Left Upper Arm, Position: Sitting, Cuff Size: Large Adult)   Pulse 82   Temp 96.6 °F (35.9 °C) (Temporal)   Resp 17   Ht 5' 1\" (1.549 m)   Wt 113 lb (51.3 kg)   SpO2 99%   BMI 21.35 kg/m²    Oxygen Saturation Interpretation: Normal.    Constitutional:  Alert, development consistent with age. Non toxic  Head: Local TTP over the right maxillary sinus  Ears:  External Ears: Bilateral pinna normal. R TM dull with some mild serous fluid, Left TM appears normal without erythema . Canals normal bilaterally without swelling or exudate  Nose:  Some thick yellow tinged nasal congestion with some injection to middle turbinates bilaterally. No active bleeding  Throat: Some  posterior pharyngeal erythema with mild post nasal drip present. No exudate or tonsillar hypertrophy noted. Neck:  Supple. There is some shotty anterior cervical adenopathy. No rigidity and no stridor. Lungs: Essentially clear to auscultation bilaterally without wheezes, rales, or rhonchi  Heart:  Regular rate and rhythm, normal heart sounds, with no murmur or ectopy. Skin:  Normal turgor. Warm, dry, without visible rash. Neurological:  Alert and oriented. Motor functions intact. Responds to verbal commands. Assessment / Plan     Impression(s):  Riley Apley was seen today for sinus problem.     Diagnoses and all orders for this visit:    Acute maxillary sinusitis, recurrence not specified  -     amoxicillin-clavulanate (AUGMENTIN) 875-125 MG per tablet; Take 1 tablet by mouth 2 times daily for 10 days      Disposition:  Disposition: Discharge to home. Script written for Augmentin BID with food, side effects discussed. Increase fluids and rest  Discussed warm compresses to face. Symptomatic relief discussed. F/u PCP in 5-7 days if symptoms persist.     ED sooner if symptoms worsen or change. Red flag symptoms discussed. Pt is in agreement with this care plan. All questions answered. Brendan Head PA-C    This visit was provided as a focused evaluation during the COVID -19 pandemic/national emergency. A comprehensive review of all previous patient history and testing was not conducted. Pertinent findings were elicited during the visit.

## 2021-05-27 ENCOUNTER — TELEPHONE (OUTPATIENT)
Dept: FAMILY MEDICINE CLINIC | Age: 71
End: 2021-05-27

## 2021-05-27 NOTE — TELEPHONE ENCOUNTER
Patient called stating she saw Nevaeh Ly at [address] 2 days ago and is c/o diarrhea since starting Augmentin and pain in face from sinuses has seemed to move farther down. Patient stated she hasn't taken Augmentin yet today and is unsure if she should. Informed patient Augmentin can cause diarrhea and that it can take a couple of days for patient to begin feeling better. I asked patient if she was using warm compresses and she stated she did some. Advised patient that Progress Note advises patient to follow up w/Cheri Rendall Schlatter, if needed, or to go to ED if symptoms increase/worsen. Advised patient that Juana Bear is only in this morning and tomorrow morning. I offered patient appt today lori/Yi at 10:00 am, if patient wanted to be seen. Patient stated she will take today's dose and see how she feels. Msg routed, for informational purposes.       Last seen 5/6/2021  Next appt 11/9/2021

## 2024-02-12 ENCOUNTER — OFFICE VISIT (OUTPATIENT)
Dept: FAMILY MEDICINE CLINIC | Age: 74
End: 2024-02-12
Payer: MEDICARE

## 2024-02-12 VITALS
RESPIRATION RATE: 16 BRPM | HEART RATE: 86 BPM | TEMPERATURE: 97.3 F | BODY MASS INDEX: 21.43 KG/M2 | WEIGHT: 113.5 LBS | HEIGHT: 61 IN | DIASTOLIC BLOOD PRESSURE: 72 MMHG | SYSTOLIC BLOOD PRESSURE: 122 MMHG | OXYGEN SATURATION: 97 %

## 2024-02-12 DIAGNOSIS — E06.3 HASHIMOTO'S THYROIDITIS: ICD-10-CM

## 2024-02-12 DIAGNOSIS — G89.29 CHRONIC RIGHT-SIDED LOW BACK PAIN WITHOUT SCIATICA: Primary | ICD-10-CM

## 2024-02-12 DIAGNOSIS — M54.50 CHRONIC RIGHT-SIDED LOW BACK PAIN WITHOUT SCIATICA: Primary | ICD-10-CM

## 2024-02-12 DIAGNOSIS — M80.00XA AGE-RELATED OSTEOPOROSIS WITH CURRENT PATHOLOGICAL FRACTURE, INITIAL ENCOUNTER: ICD-10-CM

## 2024-02-12 DIAGNOSIS — S32.040A COMPRESSION FRACTURE OF L4 VERTEBRA, INITIAL ENCOUNTER (HCC): ICD-10-CM

## 2024-02-12 LAB
ABSOLUTE IMMATURE GRANULOCYTE: <0.03 K/UL (ref 0–0.58)
ALBUMIN SERPL-MCNC: 4.3 G/DL (ref 3.5–5.2)
ALP BLD-CCNC: 199 U/L (ref 35–104)
ALT SERPL-CCNC: 12 U/L (ref 0–32)
ANION GAP SERPL CALCULATED.3IONS-SCNC: 15 MMOL/L (ref 7–16)
AST SERPL-CCNC: 14 U/L (ref 0–31)
BASOPHILS ABSOLUTE: 0.02 K/UL (ref 0–0.2)
BASOPHILS RELATIVE PERCENT: 0 % (ref 0–2)
BILIRUB SERPL-MCNC: 0.6 MG/DL (ref 0–1.2)
BUN BLDV-MCNC: 14 MG/DL (ref 6–23)
CALCIUM SERPL-MCNC: 9.7 MG/DL (ref 8.6–10.2)
CHLORIDE BLD-SCNC: 103 MMOL/L (ref 98–107)
CHOLESTEROL: 223 MG/DL
CO2: 24 MMOL/L (ref 22–29)
CREAT SERPL-MCNC: 0.8 MG/DL (ref 0.5–1)
EOSINOPHILS ABSOLUTE: 0.09 K/UL (ref 0.05–0.5)
EOSINOPHILS RELATIVE PERCENT: 2 % (ref 0–6)
GFR SERPL CREATININE-BSD FRML MDRD: >60 ML/MIN/1.73M2
GLUCOSE BLD-MCNC: 95 MG/DL (ref 74–99)
HCT VFR BLD CALC: 41.9 % (ref 34–48)
HDLC SERPL-MCNC: 84 MG/DL
HEMOGLOBIN: 13.1 G/DL (ref 11.5–15.5)
IMMATURE GRANULOCYTES: 0 % (ref 0–5)
LDL CHOLESTEROL: 128 MG/DL
LYMPHOCYTES ABSOLUTE: 1.15 K/UL (ref 1.5–4)
LYMPHOCYTES RELATIVE PERCENT: 21 % (ref 20–42)
MCH RBC QN AUTO: 31 PG (ref 26–35)
MCHC RBC AUTO-ENTMCNC: 31.3 G/DL (ref 32–34.5)
MCV RBC AUTO: 99.1 FL (ref 80–99.9)
MONOCYTES ABSOLUTE: 0.35 K/UL (ref 0.1–0.95)
MONOCYTES RELATIVE PERCENT: 6 % (ref 2–12)
NEUTROPHILS ABSOLUTE: 3.92 K/UL (ref 1.8–7.3)
NEUTROPHILS RELATIVE PERCENT: 71 % (ref 43–80)
PDW BLD-RTO: 12.4 % (ref 11.5–15)
PLATELET # BLD: 398 K/UL (ref 130–450)
PMV BLD AUTO: 10 FL (ref 7–12)
POTASSIUM SERPL-SCNC: 5.1 MMOL/L (ref 3.5–5)
RBC # BLD: 4.23 M/UL (ref 3.5–5.5)
SODIUM BLD-SCNC: 142 MMOL/L (ref 132–146)
T4 FREE: 1.4 NG/DL (ref 0.9–1.7)
TOTAL PROTEIN: 7.2 G/DL (ref 6.4–8.3)
TRIGL SERPL-MCNC: 53 MG/DL
TSH SERPL DL<=0.05 MIU/L-ACNC: 1.39 UIU/ML (ref 0.27–4.2)
VLDLC SERPL CALC-MCNC: 11 MG/DL
WBC # BLD: 5.6 K/UL (ref 4.5–11.5)

## 2024-02-12 PROCEDURE — G8484 FLU IMMUNIZE NO ADMIN: HCPCS | Performed by: NURSE PRACTITIONER

## 2024-02-12 PROCEDURE — G8399 PT W/DXA RESULTS DOCUMENT: HCPCS | Performed by: NURSE PRACTITIONER

## 2024-02-12 PROCEDURE — 1036F TOBACCO NON-USER: CPT | Performed by: NURSE PRACTITIONER

## 2024-02-12 PROCEDURE — 3017F COLORECTAL CA SCREEN DOC REV: CPT | Performed by: NURSE PRACTITIONER

## 2024-02-12 PROCEDURE — G8420 CALC BMI NORM PARAMETERS: HCPCS | Performed by: NURSE PRACTITIONER

## 2024-02-12 PROCEDURE — 99214 OFFICE O/P EST MOD 30 MIN: CPT | Performed by: NURSE PRACTITIONER

## 2024-02-12 PROCEDURE — 1090F PRES/ABSN URINE INCON ASSESS: CPT | Performed by: NURSE PRACTITIONER

## 2024-02-12 PROCEDURE — G8427 DOCREV CUR MEDS BY ELIG CLIN: HCPCS | Performed by: NURSE PRACTITIONER

## 2024-02-12 PROCEDURE — 1123F ACP DISCUSS/DSCN MKR DOCD: CPT | Performed by: NURSE PRACTITIONER

## 2024-02-12 RX ORDER — TRAMADOL HYDROCHLORIDE 50 MG/1
TABLET ORAL
COMMUNITY
Start: 2024-01-31

## 2024-02-12 RX ORDER — IBANDRONATE SODIUM 150 MG/1
150 TABLET, FILM COATED ORAL
Qty: 3 TABLET | Refills: 3 | Status: SHIPPED | OUTPATIENT
Start: 2024-02-12

## 2024-02-12 NOTE — ASSESSMENT & PLAN NOTE
New but old in review of MRI from January has follow up appt with ortho on Thursday to review the second MRI she had done in [place]. Declined medication in 2019 for osteoporosis now with compression fracture in L4. DEXA ordered today  Agrees to try Boniva 150 mg once a month. Take with 16 ounces of water and do not lay down or take any other medication or food for 1 hour.

## 2024-02-12 NOTE — ASSESSMENT & PLAN NOTE
Declined medication in 2019 now with compression fracture in L4. DEXA ordered today  Agrees to try Boniva 150 mg once a month. Take with 16 ounces of water and do not lay down or take any other medication or food for 1 hour.      Get enough calcium and vitamin D. The [place] of Medicine recommends adults younger than age 51 need 1,000 mg of calcium in divided doses and do not take at the same as any dairy is being ingested as the body can't absorb a large amount of calcium at one time and 600 IU of vitamin D each day.   Women ages 51 to 70 need 1,200 mg of calcium in divided doses and do not take at the same as any dairy is being ingested as the body can't absorb a large amount of calcium at one time and 600 IU of vitamin D each day.   Men ages 51 to 70 need 1,000 mg of calcium in divided doses and do not take at the same as any dairy is being ingested as the body can't absorb a large amount of calcium at one time and 600 IU of vitamin D each day.   Adults 71 and older need 1,200 mg of calcium  in divided doses and do not take at the same as any dairy is being ingested as the body can't absorb large amounts of calcium at one time and 800 IU of vitamin D each day.    Eat foods rich in calcium, like yogurt, cheese, milk, and dark green vegetables. This is a good way to get the calcium you need. You can get vitamin D from eggs, fatty fish, cereal, and milk.  Talk to your doctor about taking a calcium plus vitamin D supplement. Be careful, though. Adults ages 19 to 50 should not get more than 2,500 mg of calcium and 4,000 IU of vitamin D each day, whether it is from supplements and/or food.     Adults ages 51 and older should not get more than 2,000 mg of calcium and 4,000 IU of vitamin D each day from supplements and/or food.  Limit alcohol to 2 drinks a day for men and 1 drink a day for women. Too much alcohol can cause health problems.  Do not smoke. Smoking puts you at a much higher risk for osteoporosis. If you

## 2024-02-12 NOTE — PROGRESS NOTES
OFFICE PROGRESS NOTE  [facility]  [facility]  [address] [place] [address]  Dept: [phone]   Chief Complaint   Patient presents with    Back Pain     Right side back pain, No signs of UTI    Health Maintenance     Due for shingles, tdap, rsv, pne, flu(declined), [facility](sherry), AWV,        ASSESSMENT/PLAN   1. Chronic right-sided low back pain without sciatica  Assessment & Plan:  New but old in review of MRI from January has follow up appt with ortho on Thursday to review the second MRI she had done in [place].   2. Compression fracture of L4 vertebra, initial encounter ([facility])  Assessment & Plan:  New but old in review of MRI from January has follow up appt with ortho on Thursday to review the second MRI she had done in [place]. Declined medication in 2019 for osteoporosis now with compression fracture in L4. DEXA ordered today  Agrees to try Boniva 150 mg once a month. Take with 16 ounces of water and do not lay down or take any other medication or food for 1 hour.   Orders:  -     DEXA BONE DENSITY AXIAL SKELETON; Future  -     ibandronate (BONIVA) 150 MG tablet; Take 1 tablet by mouth every 30 days Take one (1) tablet once per month in the morning with a full glass of water, on an empty stomach, and do not take anything else by mouth or lie down for the next 60 minutes., Disp-3 tablet, R-3Normal  3. Hashimoto's thyroiditis  -     CBC with Auto Differential; Future  -     Comprehensive Metabolic Panel; Future  -     Lipid Panel; Future  -     TSH; Future  -     T4, Free; Future  4. Age-related osteoporosis with current pathological fracture, initial encounter  Assessment & Plan:  Declined medication in 2019 now with compression fracture in L4. DEXA ordered today  Agrees to try Boniva 150 mg once a month. Take with 16 ounces of water and do not lay down or take any other medication or food for 1 hour.      Get enough calcium and vitamin D. The

## 2024-02-12 NOTE — ASSESSMENT & PLAN NOTE
New but old in review of MRI from January has follow up appt with ortho on Thursday to review the second MRI she had done in [place].

## 2024-02-14 NOTE — RESULT ENCOUNTER NOTE
Call Michelle cholesterol is elevated about the same as last time, her alkaline phosphatase is elevated may be from the fracture in her back be sure to follow up with PCP for recheck. Thyroid is normal.

## 2024-08-20 SDOH — HEALTH STABILITY: PHYSICAL HEALTH: ON AVERAGE, HOW MANY MINUTES DO YOU ENGAGE IN EXERCISE AT THIS LEVEL?: 30 MIN

## 2024-08-20 SDOH — HEALTH STABILITY: PHYSICAL HEALTH: ON AVERAGE, HOW MANY DAYS PER WEEK DO YOU ENGAGE IN MODERATE TO STRENUOUS EXERCISE (LIKE A BRISK WALK)?: 5 DAYS

## 2024-08-21 ENCOUNTER — OFFICE VISIT (OUTPATIENT)
Dept: FAMILY MEDICINE CLINIC | Age: 74
End: 2024-08-21

## 2024-08-21 VITALS
TEMPERATURE: 98.2 F | SYSTOLIC BLOOD PRESSURE: 139 MMHG | HEIGHT: 61 IN | HEART RATE: 74 BPM | BODY MASS INDEX: 21.14 KG/M2 | WEIGHT: 112 LBS | OXYGEN SATURATION: 99 % | DIASTOLIC BLOOD PRESSURE: 65 MMHG | RESPIRATION RATE: 18 BRPM

## 2024-08-21 DIAGNOSIS — M80.00XA AGE-RELATED OSTEOPOROSIS WITH CURRENT PATHOLOGICAL FRACTURE, INITIAL ENCOUNTER: ICD-10-CM

## 2024-08-21 DIAGNOSIS — R79.89 ELEVATED LFTS: ICD-10-CM

## 2024-08-21 DIAGNOSIS — E06.3 HASHIMOTO'S THYROIDITIS: ICD-10-CM

## 2024-08-21 DIAGNOSIS — S32.040A COMPRESSION FRACTURE OF L4 VERTEBRA, INITIAL ENCOUNTER (HCC): Primary | ICD-10-CM

## 2024-08-21 DIAGNOSIS — Z12.31 ENCOUNTER FOR SCREENING MAMMOGRAM FOR MALIGNANT NEOPLASM OF BREAST: ICD-10-CM

## 2024-08-21 ASSESSMENT — ENCOUNTER SYMPTOMS
VOMITING: 0
ABDOMINAL PAIN: 0
COUGH: 0
NAUSEA: 0
SHORTNESS OF BREATH: 0

## 2024-08-21 NOTE — PROGRESS NOTES
[facility]  FAMILY MEDICINE RESIDENCY PROGRAM   OFFICE PROGRESS NOTE  DATE OF VISIT : 24    Patient : Ludmila Slaughter    : female   Age : 74 y.o.    : 1950           Chief Complaint :   Chief Complaint   Patient presents with    New Patient    Check-Up    Hair/Scalp Problem       HPI:   74 y.o. female comes in today as a new patient to establish care.   Her previous pcp is retiring     Hx of uterine prolapse-was going to [facility] for paps, hx of abnl pap x 1  20 years ago. Was told a few years ago that she does not need any further paps.     After lifting heavy pans, she had pain/trouble getting out of beds a few days later in January.  Went to urgent care at local orthopedics office. She had an x-ray and mri and told it was inconclusive initially.   She was later told her imaging showed scoliosis, compression fxs at T11 and L4 and went to [facility] ortho for a second opinion and recently the endocrine calcium clinic ( Dr. Knox) where she was advised to start Forteo for osteoporosis.  Reports it will cost her >$900.  She now has applied for financial assistance but still contemplative about starting the medication for her osteoporosis. She uses ibuprofen or tylenol to manage the pain but has not been significant.    Concerned about lesion on her scalp. She has an appointment with a dermatologist in September.  Dermatologist for [facility] for vitiligo in , was prescribed a topical steroid.    She has Hashimoto's thyroiditis and is not on medication at this time.    Also history of glaucoma, taking drops. S/p laser surgery yesterday.       Patient Active Problem List   Diagnosis    Glaucoma    Osteoporosis    Uterine prolapse    Primary vitiligo    Hashimoto's thyroiditis    Leg skin lesion, right    Post-menopausal    Compression fracture of fourth lumbar vertebra (HCC)    Chronic right-sided low back pain without sciatica       Past Medical History:   Diagnosis Date

## 2024-08-21 NOTE — PROGRESS NOTES
76 year old  postmenopausal female with a past medical history of hashimoto thyroiditis (no s/s, just positive antibodies), vitiligo, uterine prolapse, postmenopausal, glaucoma, L4 and T11 fracture 2023 and 2024, scoliosis.      Patient is currently seeing [facility] for osteoporosis and back fractures. She saw [facility] previously.     She has back pain 3/10 currently which increased to a 4/10 burning pressure like pain. It is localized and positional. She previously had an opioid prescription and muscle relaxer when the pain was worse, which helped. She was taking tylenol and ibuprofen for pain around the clock but stopped because she didn't want to take too many medications.     She has new onset right knee pain, which is worse at night and in the morning. It is relieved during the day after a few minutes of activity. It also hurts sometimes when going down the stairs. She has never had imaging on it.     She noticed a scaly bump on the top of her head a month ago. She has a dermatology appointment in September but would like us to look at it today.     She is establishing care, due to her old PCP retiring.     Surgical history: Laser eye surgery for glaucoma yesterday, and twice prior. Cataract surgery in 2016? with eye drainage surgery.     Allergies: as stated in chart    Social History:   - alcohol use: 5-6 days per week glass of red wine   - tobacco use: never   - illicit drug us: never     Care Gaps:   - colonoscopy   - mammograms ??

## 2024-12-15 SDOH — HEALTH STABILITY: PHYSICAL HEALTH: ON AVERAGE, HOW MANY MINUTES DO YOU ENGAGE IN EXERCISE AT THIS LEVEL?: 30 MIN

## 2024-12-15 SDOH — HEALTH STABILITY: PHYSICAL HEALTH: ON AVERAGE, HOW MANY DAYS PER WEEK DO YOU ENGAGE IN MODERATE TO STRENUOUS EXERCISE (LIKE A BRISK WALK)?: 1 DAY

## 2024-12-15 ASSESSMENT — PATIENT HEALTH QUESTIONNAIRE - PHQ9
2. FEELING DOWN, DEPRESSED OR HOPELESS: NOT AT ALL
SUM OF ALL RESPONSES TO PHQ QUESTIONS 1-9: 0
1. LITTLE INTEREST OR PLEASURE IN DOING THINGS: NOT AT ALL
SUM OF ALL RESPONSES TO PHQ QUESTIONS 1-9: 0
SUM OF ALL RESPONSES TO PHQ9 QUESTIONS 1 & 2: 0
SUM OF ALL RESPONSES TO PHQ QUESTIONS 1-9: 0
SUM OF ALL RESPONSES TO PHQ QUESTIONS 1-9: 0

## 2024-12-15 ASSESSMENT — LIFESTYLE VARIABLES
HOW OFTEN DO YOU HAVE A DRINK CONTAINING ALCOHOL: 4
HOW MANY STANDARD DRINKS CONTAINING ALCOHOL DO YOU HAVE ON A TYPICAL DAY: 1
HOW OFTEN DO YOU HAVE A DRINK CONTAINING ALCOHOL: 2-3 TIMES A WEEK
HOW MANY STANDARD DRINKS CONTAINING ALCOHOL DO YOU HAVE ON A TYPICAL DAY: 1 OR 2
HOW OFTEN DO YOU HAVE SIX OR MORE DRINKS ON ONE OCCASION: 1

## 2024-12-16 ENCOUNTER — OFFICE VISIT (OUTPATIENT)
Dept: FAMILY MEDICINE CLINIC | Age: 74
End: 2024-12-16
Payer: MEDICARE

## 2024-12-16 VITALS
RESPIRATION RATE: 18 BRPM | BODY MASS INDEX: 20.77 KG/M2 | OXYGEN SATURATION: 97 % | WEIGHT: 110 LBS | DIASTOLIC BLOOD PRESSURE: 69 MMHG | HEIGHT: 61 IN | HEART RATE: 77 BPM | TEMPERATURE: 97.7 F | SYSTOLIC BLOOD PRESSURE: 131 MMHG

## 2024-12-16 DIAGNOSIS — M72.2 PLANTAR FASCIITIS OF LEFT FOOT: ICD-10-CM

## 2024-12-16 DIAGNOSIS — Z12.31 ENCOUNTER FOR SCREENING MAMMOGRAM FOR BREAST CANCER: ICD-10-CM

## 2024-12-16 DIAGNOSIS — M79.672 PAIN OF LEFT HEEL: ICD-10-CM

## 2024-12-16 DIAGNOSIS — Z00.00 MEDICARE ANNUAL WELLNESS VISIT, SUBSEQUENT: Primary | ICD-10-CM

## 2024-12-16 DIAGNOSIS — Z71.89 ACP (ADVANCE CARE PLANNING): ICD-10-CM

## 2024-12-16 PROCEDURE — 1123F ACP DISCUSS/DSCN MKR DOCD: CPT | Performed by: FAMILY MEDICINE

## 2024-12-16 PROCEDURE — 3017F COLORECTAL CA SCREEN DOC REV: CPT | Performed by: FAMILY MEDICINE

## 2024-12-16 PROCEDURE — G0439 PPPS, SUBSEQ VISIT: HCPCS | Performed by: FAMILY MEDICINE

## 2024-12-16 PROCEDURE — G8484 FLU IMMUNIZE NO ADMIN: HCPCS | Performed by: FAMILY MEDICINE

## 2024-12-16 RX ORDER — TERIPARATIDE 250 UG/ML
20 INJECTION, SOLUTION SUBCUTANEOUS DAILY
COMMUNITY
Start: 2024-10-05 | End: 2025-11-20

## 2024-12-16 NOTE — PROGRESS NOTES
Apply 1 drop to eye every evening One drop to each eye in evening  ProviderDave MD   Multiple Vitamins-Minerals (THERAPEUTIC MULTIVITAMIN-MINERALS) tablet Take 1 tablet by mouth daily  Dave Santa MD   vitamin D (CHOLECALCIFEROL) 1000 UNIT TABS tablet Take 1 tablet by mouth daily  ProviderDave MD       CareTeam (Including outside providers/suppliers regularly involved in providing care):   Patient Care Team:  Carmel Schwartz MD as PCP - General (Family Medicine)  Carmel Schwartz MD as PCP - Empaneled Provider      Reviewed and updated this visit:  Tobacco  Allergies  Med Hx  Surg Hx  Soc Hx  Fam Hx